# Patient Record
Sex: FEMALE | Race: BLACK OR AFRICAN AMERICAN | NOT HISPANIC OR LATINO | URBAN - METROPOLITAN AREA
[De-identification: names, ages, dates, MRNs, and addresses within clinical notes are randomized per-mention and may not be internally consistent; named-entity substitution may affect disease eponyms.]

---

## 2017-05-25 ENCOUNTER — OFFICE VISIT - HEALTHEAST (OUTPATIENT)
Dept: RHEUMATOLOGY | Facility: CLINIC | Age: 67
End: 2017-05-25

## 2017-05-25 DIAGNOSIS — M25.562 CHRONIC PAIN OF BOTH KNEES: ICD-10-CM

## 2017-05-25 DIAGNOSIS — G89.29 CHRONIC PAIN OF BOTH KNEES: ICD-10-CM

## 2017-05-25 DIAGNOSIS — M17.0 PRIMARY OSTEOARTHRITIS OF BOTH KNEES: ICD-10-CM

## 2017-05-25 DIAGNOSIS — M25.561 CHRONIC PAIN OF BOTH KNEES: ICD-10-CM

## 2017-05-25 DIAGNOSIS — R76.8 RHEUMATOID FACTOR POSITIVE: ICD-10-CM

## 2017-06-29 ENCOUNTER — OFFICE VISIT - HEALTHEAST (OUTPATIENT)
Dept: RHEUMATOLOGY | Facility: CLINIC | Age: 67
End: 2017-06-29

## 2017-06-29 DIAGNOSIS — M19.072 PRIMARY OSTEOARTHRITIS OF BOTH ANKLES: ICD-10-CM

## 2017-06-29 DIAGNOSIS — M76.812 ANTERIOR TIBIALIS TENDONITIS OF LEFT LEG: ICD-10-CM

## 2017-06-29 DIAGNOSIS — R76.8 RHEUMATOID FACTOR POSITIVE: ICD-10-CM

## 2017-06-29 DIAGNOSIS — M19.071 PRIMARY OSTEOARTHRITIS OF BOTH ANKLES: ICD-10-CM

## 2021-05-31 VITALS — WEIGHT: 235 LBS

## 2021-06-10 NOTE — PROGRESS NOTES
ASSESSMENT AND PLAN:  Debbie Herman 66 y.o. female is here with exacerbation, severe, of pain bilaterally in the knees.  She has osteoarthritis of both her knees.  She has rheumatoid factor positivity without evidence of rheumatoid arthritis.  Various options discussed with her.  Of these she wants to proceed with corticosteroid injections as these have provided her in the past with good response.  She might be traveling to check on her family in [facility].  40 mg of Kenalog were injected into each of the knees under ultrasound guidance.  She tolerated the procedure well.  She is to return for follow-up on as-needed basis.     Diagnoses and all orders for this visit:    Primary osteoarthritis of both knees  -     triamcinolone acetonide 40 mg/mL injection 40 mg (KENALOG-40); Inject 1 mL (40 mg total) into the joint once.  -     triamcinolone acetonide 40 mg/mL injection 40 mg (KENALOG-40); Inject 1 mL (40 mg total) into the joint once.    Chronic pain of both knees  -     triamcinolone acetonide 40 mg/mL injection 40 mg (KENALOG-40); Inject 1 mL (40 mg total) into the joint once.  -     triamcinolone acetonide 40 mg/mL injection 40 mg (KENALOG-40); Inject 1 mL (40 mg total) into the joint once.    Rheumatoid factor positive          HISTORY OF PRESENTING ILLNESS:  Debbie Herman 66 y.o. is here for a moderately severe flare up of pain in.  Joints affected include both knee(s). This has gone on for a few weeks ago. Pain is described as sharp. It is when active.  Her symptoms are moderate. The symptoms are gradually worsening. Symptoms include pain, limited range of motion.  Treatment to date has been without significant relief.    Further historical information, including ROS and limitation in activities as noted in the multidimensional health assessment questionnaire scanned in the EMR and in the assessment and plan section.    She has positive rheumatoid factor.    ALLERGIES:Review of patient's allergies  indicates no known allergies.    PAST MEDICAL/ACTIVE PROBLEMS/MEDICATION/SOCIAL DATA  No past medical history on file.  History   Smoking Status     Never Smoker   Smokeless Tobacco     Not on file     Patient Active Problem List   Diagnosis     Arthralgia     Rheumatoid factor positive     Knee osteoarthritis     Obesity     Left ankle pain     Anterior tibialis tendonitis of left leg     Osteoarthritis of both ankles     Current Outpatient Prescriptions   Medication Sig Dispense Refill     amLODIPine (NORVASC) 10 MG tablet Take 10 mg by mouth daily.       atenolol (TENORMIN) 50 MG tablet Take 50 mg by mouth daily.       cyclobenzaprine (FLEXERIL) 10 MG tablet Take 10 mg by mouth 2 (two) times a day as needed for muscle spasms.       diphenhydrAMINE (BENADRYL) 25 mg tablet Take 25 mg by mouth 2 (two) times a day as needed for sleep.       ibuprofen (ADVIL,MOTRIN) 800 MG tablet        lisinopril (PRINIVIL,ZESTRIL) 20 MG tablet Take 20 mg by mouth daily.       melatonin 1 mg Tab tablet Take 2 mg by mouth bedtime as needed.       naproxen (NAPROSYN) 500 MG tablet Take 500 mg by mouth 2 (two) times a day with meals.       pravastatin (PRAVACHOL) 20 MG tablet Take 20 mg by mouth bedtime.       triamcinolone (KENALOG) 0.1 % cream Apply topically 2 (two) times a day.       No current facility-administered medications for this visit.        DETAILED EXAMINATION  05/25/17  :  Vitals:    05/25/17 1548   Pulse: 72   Weight: (!) 235 lb (106.6 kg)     Alert oriented. Head including the face is examined for malar rash, heliotropes, scarring, lupus pernio. Eyes examined for redness such as in episcleritis/scleritis, periorbital lesions.   Neck examined  for lymph nodes, range of motion Both upper and lower extremities (all four) examined for swollen, warm &/or  tender joints, range of motion, rash, muscle weakness, edema. The salient normal / abnormal findings are appended.     Bilateral JLT the knees worse on the left side.   No  appreciable synovitis in any of the palpable appendicular joints.     LAB / IMAGING DATA:  IMPRESSION:   CONCLUSION:   1. Severe tibialis anterior tendon tendinopathy with intrasubstance partial thickness tearing.   2. Mild Achilles tendon tendinopathy without tearing.   3. Mild peroneus brevis tendon tendinopathy without tearing.   4. Low-grade acute on chronic plantar fasciitis with plantar calcaneal spurring.   5. Degenerative change at the calcaneocuboid reticulation, talonavicular joint, and tibiotalar joint.   6. No evidence for fracture.   7. No significant effusion.   8. Indeterminate chronicity sprain of the ATF and calcaneofibular ligament without tearing.            ALT   Date Value Ref Range Status   05/16/2016 17 0 - 45 U/L Final   03/12/2015 37 0 - 45 U/L Final     Albumin   Date Value Ref Range Status   05/16/2016 3.3 (L) 3.5 - 5.0 g/dL Final   03/12/2015 3.8 3.5 - 5.0 g/dL Final     Creatinine   Date Value Ref Range Status   05/16/2016 0.83 0.60 - 1.10 mg/dL Final   03/12/2015 0.85 0.60 - 1.10 mg/dL Final       WBC   Date Value Ref Range Status   05/16/2016 7.4 4.0 - 11.0 thou/uL Final   03/12/2015 10.4 4.0 - 11.0 thou/uL Final     Hemoglobin   Date Value Ref Range Status   05/16/2016 11.1 (L) 12.0 - 16.0 g/dL Final   03/12/2015 12.3 12.0 - 16.0 g/dL Final     Platelets   Date Value Ref Range Status   05/16/2016 571 (H) 140 - 440 thou/uL Final   03/12/2015 422 140 - 440 thou/uL Final       Lab Results   Component Value Date    SEDRATE 23 (H) 03/12/2015

## 2021-06-11 NOTE — PROGRESS NOTES
ASSESSMENT AND PLAN:  Debbie Herman 66 y.o. female is seen here on 06/29/17 for evaluation of painful joints.  This is in her ankles.  She has positive rheumatoid factor.  She has no evidence of inflammatory arthropathy.  She has tibialis anterior tendinopathy with intrasubstance tearing on MRI.  In addition she has evidence of degenerative changes in the tibiotalar other ankle joints.  She has noted improvement with corticosteroid injections.  She has been to podiatry, and noted that intervention was not helpful that was for the tendinopathy..  Of the various options she wants to proceed with corticosteroid injections we have talked about various options.  We will choose Kenalog   for her.  20 mg of Kenalog injected into the each tibiotalar joint under ultrasound guidance.  Diagnoses and all orders for this visit:    Primary osteoarthritis of both ankles  -     triamcinolone acetonide 40 mg/mL injection 20 mg (KENALOG-40); Inject 0.5 mL (20 mg total) into the joint once.  -     triamcinolone acetonide 40 mg/mL injection 20 mg (KENALOG-40); Inject 0.5 mL (20 mg total) into the joint once.    Rheumatoid factor positive    Anterior tibialis tendonitis of left leg          HISTORY OF PRESENTING ILLNESS ON 06/29/17 :  Debbie Herman 66 y.o. is here for a moderately severe flare up of pain.  Joints affected include both ankle(s). This has gone on for a few weeks ago. Pain is described as aching and sharp. It is when active.  Her symptoms are moderate. The symptoms are gradually worsening. Symptoms include pain, tenderness to touch, limited range of motion.  Treatment to date has been with steroid injections. significant relief.   She reports bilateral knee pain has improved with the corticosteroid injections.  She does not have psoriasis.  She does not have pain swelling stiffness in the small joints of the hands wrists elbows.  Further historical information, including ROS and limitation in activities as noted in the  multidimensional health assessment questionnaire scanned in the EMR and in the assessment and plan section.    ALLERGIES:Review of patient's allergies indicates no known allergies.    PAST MEDICAL/ACTIVE PROBLEMS/MEDICATION/SOCIAL DATA  No past medical history on file.  History   Smoking Status     Never Smoker   Smokeless Tobacco     Not on file     Patient Active Problem List   Diagnosis     Arthralgia     Rheumatoid factor positive     Knee osteoarthritis     Obesity     Left ankle pain     Anterior tibialis tendonitis of left leg     Osteoarthritis of both ankles     Primary osteoarthritis of both knees     Chronic pain of both knees     Current Outpatient Prescriptions   Medication Sig Dispense Refill     amLODIPine (NORVASC) 10 MG tablet Take 10 mg by mouth daily.       atenolol (TENORMIN) 50 MG tablet Take 50 mg by mouth daily.       cyclobenzaprine (FLEXERIL) 10 MG tablet Take 10 mg by mouth 2 (two) times a day as needed for muscle spasms.       diphenhydrAMINE (BENADRYL) 25 mg tablet Take 25 mg by mouth 2 (two) times a day as needed for sleep.       ibuprofen (ADVIL,MOTRIN) 800 MG tablet        lisinopril (PRINIVIL,ZESTRIL) 20 MG tablet Take 20 mg by mouth daily.       melatonin 1 mg Tab tablet Take 2 mg by mouth bedtime as needed.       naproxen (NAPROSYN) 500 MG tablet Take 500 mg by mouth 2 (two) times a day with meals.       pravastatin (PRAVACHOL) 20 MG tablet Take 20 mg by mouth bedtime.       triamcinolone (KENALOG) 0.1 % cream Apply topically 2 (two) times a day.       No current facility-administered medications for this visit.          DETAILED EXAMINATION ON 06/29/17 :  Vitals:    06/29/17 1533   BP: 96/54   Patient Site: Right Arm   Patient Position: Sitting   Cuff Size: Adult Large   Pulse: 68   Weight: (!) 235 lb (106.6 kg)    Comfortable.  Alert oriented.  Eyes are without inflammatory changes.  Examination of both upper and lower extremities is performed for swollen & tender joints, range of  motion, rash, weakness, discoloration, warmth, swelling.  The skin examined for nodules. The salient normal / abnormal findings are appended.  She has tenderness in the tibiotalar joints bilaterally.  Inversion and eversion are less painful than the dorsi and plantar flexion.  We reviewed the data from MRI of the knees ankles and the x-rays of the ankles.    LAB / IMAGING DATA:  ALT   Date Value Ref Range Status   05/16/2016 17 0 - 45 U/L Final   03/12/2015 37 0 - 45 U/L Final     Albumin   Date Value Ref Range Status   05/16/2016 3.3 (L) 3.5 - 5.0 g/dL Final   03/12/2015 3.8 3.5 - 5.0 g/dL Final     Creatinine   Date Value Ref Range Status   05/16/2016 0.83 0.60 - 1.10 mg/dL Final   03/12/2015 0.85 0.60 - 1.10 mg/dL Final       WBC   Date Value Ref Range Status   05/16/2016 7.4 4.0 - 11.0 thou/uL Final   03/12/2015 10.4 4.0 - 11.0 thou/uL Final     Hemoglobin   Date Value Ref Range Status   05/16/2016 11.1 (L) 12.0 - 16.0 g/dL Final   03/12/2015 12.3 12.0 - 16.0 g/dL Final     Platelets   Date Value Ref Range Status   05/16/2016 571 (H) 140 - 440 thou/uL Final   03/12/2015 422 140 - 440 thou/uL Final       Lab Results   Component Value Date    SEDRATE 23 (H) 03/12/2015

## 2021-06-15 PROBLEM — M25.562 CHRONIC PAIN OF BOTH KNEES: Status: ACTIVE | Noted: 2017-05-25

## 2021-06-15 PROBLEM — M25.561 CHRONIC PAIN OF BOTH KNEES: Status: ACTIVE | Noted: 2017-05-25

## 2021-06-15 PROBLEM — M17.0 PRIMARY OSTEOARTHRITIS OF BOTH KNEES: Status: ACTIVE | Noted: 2017-05-25

## 2021-06-15 PROBLEM — G89.29 CHRONIC PAIN OF BOTH KNEES: Status: ACTIVE | Noted: 2017-05-25
